# Patient Record
(demographics unavailable — no encounter records)

---

## 2024-12-10 NOTE — DISCUSSION/SUMMARY
[FreeTextEntry1] : Pt with likely viral illness with secondary acute asthma exacerbation. Advised to increase albuterol nebulizer treatments to TID for now and taper to BID then off as tolerated. Will resend oral course of steroids for acute exacerbation. RVP also sent for possible mycoplasma testing. Return precautions given, will followup with family in 1-2 days with testing results.

## 2024-12-10 NOTE — PHYSICAL EXAM
[Clear] : right tympanic membrane clear [Erythematous Oropharynx] : nonerythematous oropharynx [Wheezing] : wheezing [Rales] : no rales [Crackles] : no crackles [Rhonchi] : rhonchi [Regular Rate and Rhythm] : regular rate and rhythm [Normal S1, S2 audible] : normal S1, S2 audible [NL] : no abnormal lymph nodes palpated [No Abnormal Lymph Nodes Palpated] : no abnormal lymph nodes palpated

## 2024-12-10 NOTE — HISTORY OF PRESENT ILLNESS
[FreeTextEntry6] : Wet cough and some congestion x 3 days. Tactile warmth. No vomiting or diarrhea. No abdominal pain or sore throat. Cough is waking him up at night.  Has been using albuterol nebulizer, last given last night, but given inconsistently.

## 2024-12-13 NOTE — DISCUSSION/SUMMARY
[FreeTextEntry1] : Pt improving well after RSV infection with secondary asthma exacerbation. Advised to continue albuterol for another day or two then wean off. Return if symptoms worsen or do not improve.   Also advised to return to clinic for asthma reassessment if he has another cough with viral infection.

## 2024-12-13 NOTE — PHYSICAL EXAM
[Clear] : left tympanic membrane clear [Erythema] : erythema [Bulging] : not bulging [Purulent Effusion] : no purulent effusion [Erythematous Oropharynx] : nonerythematous oropharynx [NL] : regular rate and rhythm, normal S1, S2 audible, no murmurs [FreeTextEntry7] : mild expiratory wheezing in LLL, open and well aerated air sounds throughout all lung fields

## 2024-12-13 NOTE — HISTORY OF PRESENT ILLNESS
[de-identified] : rsv follow up [FreeTextEntry6] : Doing much better in the past two days. Cough has improved, no longer waking him up during the night. Now on albuterol once a day. Today was last day of oral steroid. No fevers.

## 2025-01-09 NOTE — DISCUSSION/SUMMARY
[FreeTextEntry1] : 6 yo here with Flu A, day 4 of fever.  Non toxic, moving air well.  Recommend 2 puffs Albuterol with spacer Q4 while sick.  Sent in prednisone to have at home in case asthma worsens.  Supportive care for Flu.  If fever contineus through day 5, follow up at day 6.   Supportive measures for upper respiratory infection were discussed. Such measures include use of nasal saline and suction as needed to clear the nasal passages, increasing fluids, hot showers or steam from the bathroom, propping the child up on a second pillow (for children > 1 year old), use of an OTC home remedy such as vapo rub for comfort and giving 1 tablespoon of honey an hour before bedtime for cough. (Honey is only to be given for children 1 year of age and older) Tylenol can be used every 4 hours as needed for fever or pain and Motrin can be used every 6 hours as needed for fever or pain.  If child has a fever of 100.4 or more or symptoms are worsening at any time, return for recheck or seek other medical attention.

## 2025-01-09 NOTE — HISTORY OF PRESENT ILLNESS
[FreeTextEntry6] : UMA BERRIOS is a 5 year old male presenting for complaints of cough and fever. He was seen at urgent care on Monday and dx'd with Flu A.   Tmax 104, febrile since Monday, today is day 4.  Decreased appetite, drinking well.   Has not used his albuterol.

## 2025-01-09 NOTE — PHYSICAL EXAM
[Acute Distress] : no acute distress [Clear Rhinorrhea] : clear rhinorrhea [Transmitted Upper Airway Sounds] : transmitted upper airway sounds [NL] : soft, nontender, nondistended, normal bowel sounds, no hepatosplenomegaly [No Abnormal Lymph Nodes Palpated] : no abnormal lymph nodes palpated [Warm] : warm